# Patient Record
Sex: MALE | Race: WHITE | NOT HISPANIC OR LATINO | Employment: OTHER | URBAN - METROPOLITAN AREA
[De-identification: names, ages, dates, MRNs, and addresses within clinical notes are randomized per-mention and may not be internally consistent; named-entity substitution may affect disease eponyms.]

---

## 2020-03-02 ENCOUNTER — OFFICE VISIT (OUTPATIENT)
Dept: URGENT CARE | Facility: CLINIC | Age: 60
End: 2020-03-02
Payer: OTHER GOVERNMENT

## 2020-03-02 ENCOUNTER — HOSPITAL ENCOUNTER (OUTPATIENT)
Facility: MEDICAL CENTER | Age: 60
End: 2020-03-02
Attending: PHYSICIAN ASSISTANT
Payer: OTHER GOVERNMENT

## 2020-03-02 VITALS
TEMPERATURE: 98.6 F | HEIGHT: 70 IN | WEIGHT: 257 LBS | RESPIRATION RATE: 16 BRPM | DIASTOLIC BLOOD PRESSURE: 86 MMHG | SYSTOLIC BLOOD PRESSURE: 136 MMHG | HEART RATE: 86 BPM | OXYGEN SATURATION: 91 % | BODY MASS INDEX: 36.79 KG/M2

## 2020-03-02 DIAGNOSIS — L02.91 ABSCESS: ICD-10-CM

## 2020-03-02 DIAGNOSIS — J06.9 VIRAL UPPER RESPIRATORY TRACT INFECTION: ICD-10-CM

## 2020-03-02 PROCEDURE — 10060 I&D ABSCESS SIMPLE/SINGLE: CPT | Performed by: PHYSICIAN ASSISTANT

## 2020-03-02 PROCEDURE — 87070 CULTURE OTHR SPECIMN AEROBIC: CPT

## 2020-03-02 PROCEDURE — 87205 SMEAR GRAM STAIN: CPT

## 2020-03-02 PROCEDURE — 99203 OFFICE O/P NEW LOW 30 MIN: CPT | Mod: 25 | Performed by: PHYSICIAN ASSISTANT

## 2020-03-02 PROCEDURE — 99000 SPECIMEN HANDLING OFFICE-LAB: CPT | Performed by: PHYSICIAN ASSISTANT

## 2020-03-02 RX ORDER — DOXYCYCLINE HYCLATE 100 MG
100 TABLET ORAL 2 TIMES DAILY
Qty: 10 TAB | Refills: 0 | Status: SHIPPED | OUTPATIENT
Start: 2020-03-02 | End: 2020-03-07

## 2020-03-02 ASSESSMENT — ENCOUNTER SYMPTOMS
SORE THROAT: 0
HEMOPTYSIS: 0
RHINORRHEA: 1
DIARRHEA: 1
COUGH: 1
ROS SKIN COMMENTS: ABSCESS ON BACK
ABDOMINAL PAIN: 0
FEVER: 0
WHEEZING: 1
VOMITING: 0
BLOOD IN STOOL: 0
EYES NEGATIVE: 1
NAUSEA: 0
SHORTNESS OF BREATH: 1
MYALGIAS: 0
HEADACHES: 0
CHILLS: 0

## 2020-03-02 NOTE — PROGRESS NOTES
"Subjective:      Jerad Roldan is a 60 y.o. male who presents with Cough (patient states bad four day illness that caused chest congestion and chills and sweats, made him light headed and had to use Trendelenberg position to stay conscious, diarrhea. Now feels as though he may have a lung infection.)           Cough   This is a new problem. Episode onset: 4 days. The problem has been gradually worsening. The problem occurs constantly. The cough is productive of sputum. Associated symptoms include rhinorrhea, shortness of breath and wheezing. Pertinent negatives include no chest pain, chills, ear pain, fever, headaches, hemoptysis, myalgias, nasal congestion or sore throat.   Onset of symptoms 3 days ago,   Cold sweats, fevers, body aches, \"went into shock and had to lie down for a little Possibly due to the diarrhea\". Diarrhea is going away. Have not gone in a while. Fever, chills, and body aches have resolved.     Nyquil for 3 days. Started to feel better yesterday. Feels like I have infection in right lung.   Albuterol inhaler provides some relief.   No ibuprofen or tylenol today.     Abscess  Location. Onset 1 year ago. Waxing and waning. Location: left mid back.   Several months have been periodically squeezing it with some relief.   Passed couple weeks, redness tracking spread to left side and feels hard to touch.      Denies any fever or chills.        Review of Systems   Constitutional: Positive for malaise/fatigue. Negative for chills and fever.   HENT: Positive for congestion and rhinorrhea. Negative for ear pain and sore throat.    Eyes: Negative.    Respiratory: Positive for cough, shortness of breath and wheezing. Negative for hemoptysis.    Cardiovascular: Negative for chest pain.   Gastrointestinal: Positive for diarrhea. Negative for abdominal pain, blood in stool, nausea and vomiting.   Genitourinary: Negative.    Musculoskeletal: Negative for myalgias.   Skin:        Abscess on back  " "  Neurological: Negative for headaches.          Objective:     /86 (BP Location: Right arm, Patient Position: Sitting, BP Cuff Size: Large adult)   Pulse 86   Temp 37 °C (98.6 °F) (Temporal)   Resp 16   Ht 1.778 m (5' 10\")   Wt 116.6 kg (257 lb)   SpO2 91%   BMI 36.88 kg/m²      Physical Exam  Vitals signs reviewed.   Constitutional:       Appearance: Normal appearance.   HENT:      Right Ear: Tympanic membrane normal.      Left Ear: Tympanic membrane normal.      Nose: Nose normal.      Mouth/Throat:      Mouth: Mucous membranes are moist.      Pharynx: No oropharyngeal exudate or posterior oropharyngeal erythema.   Eyes:      Conjunctiva/sclera: Conjunctivae normal.   Cardiovascular:      Rate and Rhythm: Normal rate and regular rhythm.      Heart sounds: Normal heart sounds.   Pulmonary:      Effort: Pulmonary effort is normal. No respiratory distress.      Breath sounds: Normal breath sounds. No wheezing, rhonchi or rales.   Lymphadenopathy:      Cervical: No cervical adenopathy.   Skin:     General: Skin is warm and dry.             Comments: Small 2 cm round fluctuant nodule with no drainage.   Mild surrounding erythema and extension of erythema and induration about 8cm laterally. Mild edema. No other abscess identified. No crepitus.      Neurological:      General: No focal deficit present.      Mental Status: He is alert and oriented to person, place, and time.   Psychiatric:         Mood and Affect: Mood normal.         Behavior: Behavior normal.             History reviewed. No pertinent past medical history. History reviewed. No pertinent surgical history.   Social History     Socioeconomic History   • Marital status:      Spouse name: Not on file   • Number of children: Not on file   • Years of education: Not on file   • Highest education level: Not on file   Occupational History   • Not on file   Social Needs   • Financial resource strain: Not on file   • Food insecurity     Worry: " Not on file     Inability: Not on file   • Transportation needs     Medical: Not on file     Non-medical: Not on file   Tobacco Use   • Smoking status: Not on file   Substance and Sexual Activity   • Alcohol use: Not on file   • Drug use: Not on file   • Sexual activity: Not on file   Lifestyle   • Physical activity     Days per week: Not on file     Minutes per session: Not on file   • Stress: Not on file   Relationships   • Social connections     Talks on phone: Not on file     Gets together: Not on file     Attends Synagogue service: Not on file     Active member of club or organization: Not on file     Attends meetings of clubs or organizations: Not on file     Relationship status: Not on file   • Intimate partner violence     Fear of current or ex partner: Not on file     Emotionally abused: Not on file     Physically abused: Not on file     Forced sexual activity: Not on file   Other Topics Concern   • Not on file   Social History Narrative   • Not on file    Iodine and Shellfish allergy    Incision and Drainage Procedure    Indication: Abscess    Location: left thoracic back     Procedure: The patient was positioned appropriately and the skin over the incision site was prepped with betadine and draped in a sterile fashion. Local anesthesia was obtained by infiltration using 1% Lidocaine with epinephrine.  An incision was then made over the greatest area of fluctuance and approximately 4 cc of thick purulent and bloody material was expressed. Loculations were not present. The drainage cavity was then irrigated. Packed with 1/4 inch. Dressed with  Nonstick gauze.     The patient tolerated the procedure well.    Complications: None        Assessment/Plan:     1. Abscess    - doxycycline (VIBRAMYCIN) 100 MG Tab; Take 1 Tab by mouth 2 times a day for 5 days.  Dispense: 10 Tab; Refill: 0  - CULTURE WOUND W/ GRAM STAIN; Future    Abscess was incision and drainage.  See procedure note above.  Will treat with  doxycycline for 5 days to cover for bacterial infection.  Culture wound.  Instructed he may pull the packing in 2 days.   Advised to return in 3 days for reevaluation and any appropriate medication changes.     Patient states he is allergic to iodine.  He states, about 25 years ago, he says he drank a material that may have had iodine in it for requirement of visualization of his veins and stomach area.  The next day, he developed a rash to his buttocks area.  Consulted with Dr. Lomax here in the clinic about patient's possible iodine allergy.  He states there is iodine in food and salt and most likely he had reaction from some other substance.  He states it is okay to keep packing in but careful watch the area for any reaction.  I agreed to plan. I discussed with the patient that if any rash, welts, worsening swelling, or any concerns of allergic reaction, to pull packing out of abscess and return to the urgent care, or if any difficulty breathing, wheezing, severe reaction throughout his whole body, present to the emergency room.    Return earlier if any fevers, chills, worsening surrounding redness. The patient verbalized understanding and is in agreement.    2. Viral upper respiratory tract infection    The patient presents today with signs and symptoms consistent with a upper respiratory infection most likely viral etiology. They have a normal pulse oximetry on room air, afebrile, and a normal pulmonary exam. Therefore, I feel that the likelihood of pneumonia is low. Overall, the patient is very well appearing.     Recommended Tylenol/Ibuprofen for pain/fever, OTC cough and decongestant medication, Flonase, nasal saline washes.   Advised to return to the clinic or present to the ED if any worsening symptoms such as fever/chills, difficulty breathing, abdominal pain, or vomiting. Patient understood and agreed to plan of care.     Supportive care, differential diagnoses, and indications for immediate  follow-up discussed with patient.    Pathogenesis of diagnosis discussed including typical length and natural progression. Patient expresses understanding and agrees to plan.    Please note that this dictation was created using voice recognition software. I have made every reasonable attempt to correct obvious errors, but I expect that there are errors of grammar and possibly content that I did not discover before finalizing the note.

## 2020-03-03 LAB
GRAM STN SPEC: NORMAL
SIGNIFICANT IND 70042: NORMAL
SITE SITE: NORMAL
SOURCE SOURCE: NORMAL

## 2020-03-05 ENCOUNTER — OFFICE VISIT (OUTPATIENT)
Dept: URGENT CARE | Facility: CLINIC | Age: 60
End: 2020-03-05
Payer: OTHER GOVERNMENT

## 2020-03-05 VITALS
RESPIRATION RATE: 20 BRPM | WEIGHT: 260 LBS | DIASTOLIC BLOOD PRESSURE: 86 MMHG | TEMPERATURE: 98.7 F | HEART RATE: 89 BPM | BODY MASS INDEX: 37.22 KG/M2 | OXYGEN SATURATION: 95 % | HEIGHT: 70 IN | SYSTOLIC BLOOD PRESSURE: 120 MMHG

## 2020-03-05 DIAGNOSIS — L03.90 WOUND CELLULITIS: ICD-10-CM

## 2020-03-05 LAB
BACTERIA WND AEROBE CULT: ABNORMAL
BACTERIA WND AEROBE CULT: ABNORMAL
GRAM STN SPEC: ABNORMAL
SIGNIFICANT IND 70042: ABNORMAL
SITE SITE: ABNORMAL
SOURCE SOURCE: ABNORMAL

## 2020-03-05 PROCEDURE — 99024 POSTOP FOLLOW-UP VISIT: CPT | Performed by: PHYSICIAN ASSISTANT

## 2020-03-05 RX ORDER — CEPHALEXIN 500 MG/1
500 CAPSULE ORAL 4 TIMES DAILY
Qty: 20 CAP | Refills: 0 | Status: SHIPPED | OUTPATIENT
Start: 2020-03-05 | End: 2020-03-10

## 2020-03-05 ASSESSMENT — ENCOUNTER SYMPTOMS
FEVER: 0
CHILLS: 0

## 2020-03-05 ASSESSMENT — PAIN SCALES - GENERAL: PAINLEVEL: NO PAIN

## 2020-03-05 NOTE — PROGRESS NOTES
"Subjective:      Jerad Roldan is a 60 y.o. male who presents with Follow-Up (follow up on wound on back 3 day check)          HPI  Patient is a 60-year-old male who presents to the clinic for follow-up on wound check.  He was diagnosed with abscess and had incision and drainage 3 days ago and it was packed.  He was placed on doxycycline for possible bacterial infection.  He pulled the packing 2 days later and reports 2 tablespoons of purulent and bloody material.  Denies any worsening redness or drainage.    Currently, he denies any pain to the area or pain to palpation to his wound.  He denies any fevers, chills.  He has no other further complaints or concerns.       Review of Systems   Constitutional: Negative for chills and fever.   Skin: Negative for itching.        Drained abscess recheck.           Objective:     /86   Pulse 89   Temp 37.1 °C (98.7 °F) (Temporal)   Resp 20   Ht 1.778 m (5' 10\")   Wt 117.9 kg (260 lb)   SpO2 95%   BMI 37.31 kg/m²      Physical Exam  Vitals signs reviewed.   Constitutional:       General: He is not in acute distress.     Appearance: Normal appearance. He is not ill-appearing.   Eyes:      Conjunctiva/sclera: Conjunctivae normal.   Skin:     General: Skin is warm and dry.             Comments: Well-healing Small 2 cm wound with no drainage.  Improved surrounding erythema.  Continued extension of pink coloration and induration about 8cm laterally. No edema. No other abscess identified. No crepitus. No pain to palpation   Neurological:      General: No focal deficit present.      Mental Status: He is alert and oriented to person, place, and time.   Psychiatric:         Mood and Affect: Mood normal.         Behavior: Behavior normal.       No past medical history on file. No past surgical history on file.   Social History     Socioeconomic History   • Marital status:      Spouse name: Not on file   • Number of children: Not on file   • Years of education: " Not on file   • Highest education level: Not on file   Occupational History   • Not on file   Social Needs   • Financial resource strain: Not on file   • Food insecurity     Worry: Not on file     Inability: Not on file   • Transportation needs     Medical: Not on file     Non-medical: Not on file   Tobacco Use   • Smoking status: Not on file   Substance and Sexual Activity   • Alcohol use: Not on file   • Drug use: Not on file   • Sexual activity: Not on file   Lifestyle   • Physical activity     Days per week: Not on file     Minutes per session: Not on file   • Stress: Not on file   Relationships   • Social connections     Talks on phone: Not on file     Gets together: Not on file     Attends Anabaptist service: Not on file     Active member of club or organization: Not on file     Attends meetings of clubs or organizations: Not on file     Relationship status: Not on file   • Intimate partner violence     Fear of current or ex partner: Not on file     Emotionally abused: Not on file     Physically abused: Not on file     Forced sexual activity: Not on file   Other Topics Concern   • Not on file   Social History Narrative   • Not on file    Iodine and Shellfish allergy            Assessment/Plan:   1. Wound cellulitis    - cephALEXin (KEFLEX) 500 MG Cap; Take 1 Cap by mouth 4 times a day for 5 days.  Dispense: 20 Cap; Refill: 0    The wound appears to be improved compared to 3 days ago after incision and drainage.  Erythema has improved.  Wound culture result positive for Peptostreptococcus.  Due to continued tracking and duration from the wound, we will add cephalexin to treatment regimen.     Minimal pinkish/red drainage was expressed from the wound.  Wound was redressed.     Patient is overall well-appearing, afebrile, no complaints.  He was instructed to return to the urgent care for reevaluation in 5 days.     Return earlier if any fevers, chills, worsening redness, purulent discharge, or any other  concerns    Supportive care, differential diagnoses, and indications for immediate follow-up discussed with patient.    Pathogenesis of diagnosis discussed including typical length and natural progression. Patient expresses understanding and agrees to plan.    Please note that this dictation was created using voice recognition software. I have made every reasonable attempt to correct obvious errors, but I expect that there are errors of grammar and possibly content that I did not discover before finalizing the note.

## 2020-03-10 ENCOUNTER — OFFICE VISIT (OUTPATIENT)
Dept: URGENT CARE | Facility: CLINIC | Age: 60
End: 2020-03-10
Payer: OTHER GOVERNMENT

## 2020-03-10 VITALS
DIASTOLIC BLOOD PRESSURE: 84 MMHG | HEART RATE: 86 BPM | HEIGHT: 70 IN | WEIGHT: 263 LBS | TEMPERATURE: 98.9 F | OXYGEN SATURATION: 93 % | BODY MASS INDEX: 37.65 KG/M2 | RESPIRATION RATE: 16 BRPM | SYSTOLIC BLOOD PRESSURE: 130 MMHG

## 2020-03-10 DIAGNOSIS — Z51.89 WOUND CHECK, ABSCESS: ICD-10-CM

## 2020-03-10 PROCEDURE — 99024 POSTOP FOLLOW-UP VISIT: CPT | Performed by: PHYSICIAN ASSISTANT

## 2020-03-10 ASSESSMENT — ENCOUNTER SYMPTOMS
ROS SKIN COMMENTS: + ABSCESS
NAUSEA: 0
DIARRHEA: 0
FEVER: 0
MUSCULOSKELETAL NEGATIVE: 1
SHORTNESS OF BREATH: 0
CHILLS: 0
COUGH: 0
DIZZINESS: 0
VOMITING: 0
ABDOMINAL PAIN: 0

## 2020-03-10 NOTE — PROGRESS NOTES
"Subjective:      Jerad Roldan is a 60 y.o. male who presents with Wound Check            Wound Check   He was originally treated 5 to 10 days ago (8 days). Previous treatment included I&D of abscess and oral antibiotics. The maximum temperature noted was less than 100.4 F. The temperature was taken using an axillary reading. There has been no drainage from the wound. There is no redness present. There is no swelling present. There is no pain present. He has no difficulty moving the affected extremity or digit.     Patient was seen in urgent care initially 8 days ago for an abscess present on the left side of his back. I&D was performed and he was placed on a course of doxycycline. A course of keflex was then added at his follow up visit 5 days ago, which he just finished today. He reports significant improvement in the area. No pain, swelling, or discharge from the site. No fevers, chills, body aches, nausea, or vomiting.     Review of Systems   Constitutional: Negative for chills and fever.   HENT: Negative for congestion.    Respiratory: Negative for cough and shortness of breath.    Cardiovascular: Negative for chest pain.   Gastrointestinal: Negative for abdominal pain, diarrhea, nausea and vomiting.   Genitourinary: Negative.    Musculoskeletal: Negative.    Skin: Negative for rash.        + abscess   Neurological: Negative for dizziness.        Objective:     /84 (BP Location: Left arm, Patient Position: Sitting, BP Cuff Size: Adult)   Pulse 86   Temp 37.2 °C (98.9 °F) (Temporal)   Resp 16   Ht 1.778 m (5' 10\")   Wt 119.3 kg (263 lb)   SpO2 93%   BMI 37.74 kg/m²      Physical Exam  Vitals signs and nursing note reviewed.   Constitutional:       Appearance: Normal appearance. He is well-developed.   HENT:      Head: Normocephalic and atraumatic.      Nose: Nose normal.      Mouth/Throat:      Mouth: Mucous membranes are moist.   Eyes:      Pupils: Pupils are equal, round, and reactive to " light.   Neck:      Musculoskeletal: Normal range of motion.   Cardiovascular:      Rate and Rhythm: Normal rate and regular rhythm.   Pulmonary:      Effort: Pulmonary effort is normal.   Musculoskeletal: Normal range of motion.   Skin:     General: Skin is warm and dry.             Comments: Well healing wound present on left mid back with hyperpigmentation and very minimal induration noted. No erythema, edema, warmth to touch, or TTP. No fluctuance or discharge present.    Neurological:      Mental Status: He is alert and oriented to person, place, and time.   Psychiatric:         Behavior: Behavior normal.          PMH:  has no past medical history on file.  MEDS:   Current Outpatient Medications:   •  METFORMIN HCL PO, Take 500 mg by mouth 2 times a day, with meals., Disp: , Rfl:   •  GLIPIZIDE PO, Take 10 mg by mouth 2 times a day., Disp: , Rfl:   •  Nebivolol HCl (BYSTOLIC PO), Take  by mouth., Disp: , Rfl:   •  cephALEXin (KEFLEX) 500 MG Cap, Take 1 Cap by mouth 4 times a day for 5 days. (Patient not taking: Reported on 3/10/2020), Disp: 20 Cap, Rfl: 0  ALLERGIES:   Allergies   Allergen Reactions   • Iodine Hives   • Shellfish Allergy Hives     SURGHX: History reviewed. No pertinent surgical history.  SOCHX:    FH: family history is not on file.     Assessment/Plan:       1. Wound check, abscess    No evidence of residual infection at today's visit. No need for additional antibiotics. Monitor closely and RTC if signs of infection return. The patient demonstrated a good understanding and agreed with the treatment plan.

## 2021-07-13 ENCOUNTER — NON-PROVIDER VISIT (OUTPATIENT)
Dept: SLEEP MEDICINE | Facility: MEDICAL CENTER | Age: 61
End: 2021-07-13
Payer: COMMERCIAL

## 2021-07-13 VITALS — BODY MASS INDEX: 35.36 KG/M2 | HEIGHT: 70 IN | WEIGHT: 247 LBS

## 2021-07-13 DIAGNOSIS — R06.02 SHORTNESS OF BREATH: ICD-10-CM

## 2021-07-13 ASSESSMENT — PULMONARY FUNCTION TESTS
FEV1_PERCENT_PREDICTED: 93
FEV1: 3.24
FEV1/FVC_PERCENT_PREDICTED: 77
FEV1/FVC_PERCENT_PREDICTED: 103
FEV1/FVC: 79
FVC_PERCENT_PREDICTED: 90
FEV1_LLN: 2.90
FEV1/FVC_PREDICTED: 77
FEV1/FVC_PERCENT_PREDICTED: 102
FVC_PREDICTED: 4.52
FVC: 4.09
FEV1_PREDICTED: 3.48
FEV1/FVC: 79
FVC_LLN: 3.78
FEV1/FVC_PERCENT_LLN: 64
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
## 2021-07-14 PROCEDURE — 94729 DIFFUSING CAPACITY: CPT | Performed by: INTERNAL MEDICINE

## 2021-07-14 PROCEDURE — 94726 PLETHYSMOGRAPHY LUNG VOLUMES: CPT | Performed by: INTERNAL MEDICINE

## 2021-07-14 PROCEDURE — 94010 BREATHING CAPACITY TEST: CPT | Performed by: INTERNAL MEDICINE

## 2024-09-30 ENCOUNTER — APPOINTMENT (OUTPATIENT)
Dept: RADIOLOGY | Facility: IMAGING CENTER | Age: 64
End: 2024-09-30
Attending: NURSE PRACTITIONER
Payer: OTHER GOVERNMENT

## 2024-09-30 ENCOUNTER — OFFICE VISIT (OUTPATIENT)
Dept: URGENT CARE | Facility: CLINIC | Age: 64
End: 2024-09-30
Payer: OTHER GOVERNMENT

## 2024-09-30 VITALS
WEIGHT: 247.2 LBS | TEMPERATURE: 98.9 F | BODY MASS INDEX: 36.61 KG/M2 | SYSTOLIC BLOOD PRESSURE: 128 MMHG | HEIGHT: 69 IN | DIASTOLIC BLOOD PRESSURE: 84 MMHG | OXYGEN SATURATION: 96 % | HEART RATE: 89 BPM | RESPIRATION RATE: 19 BRPM

## 2024-09-30 DIAGNOSIS — Z23 NEED FOR TETANUS BOOSTER: ICD-10-CM

## 2024-09-30 DIAGNOSIS — S92.424A CLOSED NONDISPLACED FRACTURE OF DISTAL PHALANX OF RIGHT GREAT TOE, INITIAL ENCOUNTER: Primary | ICD-10-CM

## 2024-09-30 DIAGNOSIS — S99.921A INJURY OF TOE ON RIGHT FOOT, INITIAL ENCOUNTER: ICD-10-CM

## 2024-09-30 DIAGNOSIS — L08.9 TOE INFECTION: ICD-10-CM

## 2024-09-30 DIAGNOSIS — S91.139A PUNCTURE WOUND OF TOE, INITIAL ENCOUNTER: ICD-10-CM

## 2024-09-30 PROCEDURE — 73660 X-RAY EXAM OF TOE(S): CPT | Mod: TC,FY,RT | Performed by: NURSE PRACTITIONER

## 2024-09-30 RX ORDER — CEPHALEXIN 500 MG/1
500 CAPSULE ORAL 4 TIMES DAILY
Qty: 20 CAPSULE | Refills: 0 | Status: SHIPPED | OUTPATIENT
Start: 2024-09-30 | End: 2024-10-05

## 2024-09-30 RX ORDER — VALSARTAN AND HYDROCHLOROTHIAZIDE 320; 25 MG/1; MG/1
TABLET, FILM COATED ORAL
COMMUNITY
Start: 2024-06-06

## 2024-09-30 RX ORDER — ALBUTEROL SULFATE 90 UG/1
INHALANT RESPIRATORY (INHALATION)
COMMUNITY
Start: 2024-07-03

## 2024-09-30 NOTE — PROGRESS NOTES
"Jerad Roldan is a 64 y.o. male who presents for Toe Pain (Right Toe Swelling 4 days )      HPI  This is a new problem. Jerad Roldan is a 64 y.o. patient who presents to urgent care with c/o: Stubbed his toe on a piece of wood 4 days ago.  There is increased swelling and redness.  There was a puncture into the tip of his toe.  He washed it and scrubbed it and then soaked it with Epsom salt.  He does not believe there is a foreign body but because of the increased swelling and tenderness now he is uncertain.  He is here because he needs a tetanus booster.  Is has been many years since he has had a tetanus shot.  No other aggravating relieving factors.  Denies fever, chills, bony pain    ROS See HPI    Allergies:       Allergies   Allergen Reactions    Iodine Hives    Shellfish Allergy Hives       PMSFS Hx:  No past medical history on file.  No past surgical history on file.  No family history on file.  Social History     Tobacco Use    Smoking status: Never    Smokeless tobacco: Never   Substance Use Topics    Alcohol use: Not Currently       Problems:   There is no problem list on file for this patient.      Medications:   Current Outpatient Medications on File Prior to Visit   Medication Sig Dispense Refill    albuterol 108 (90 Base) MCG/ACT Aero Soln inhalation aerosol Inhale.      valsartan-hydrochlorothiazide (DIOVAN-HCT) 320-25 MG per tablet       METFORMIN HCL PO Take 500 mg by mouth 2 times a day, with meals.      GLIPIZIDE PO Take 10 mg by mouth 2 times a day.      Nebivolol HCl (BYSTOLIC PO) Take  by mouth.       No current facility-administered medications on file prior to visit.        Objective:     /84   Pulse 89   Temp 37.2 °C (98.9 °F) (Temporal)   Resp 19   Ht 1.753 m (5' 9\")   Wt 112 kg (247 lb 3.2 oz)   SpO2 96%   BMI 36.51 kg/m²     Physical Exam  Vitals and nursing note reviewed.   Constitutional:       Appearance: He is well-developed.   HENT:      Head: Normocephalic and " atraumatic.   Cardiovascular:      Rate and Rhythm: Normal rate and regular rhythm.      Heart sounds: Normal heart sounds.   Pulmonary:      Effort: Pulmonary effort is normal.      Breath sounds: Normal breath sounds.   Musculoskeletal:      Cervical back: Normal range of motion.      Right foot: Swelling (Right great toe), tenderness and bony tenderness present.        Legs:    Skin:     General: Skin is warm and dry.   Neurological:      Mental Status: He is alert and oriented to person, place, and time.   Psychiatric:         Behavior: Behavior normal.         Thought Content: Thought content normal.         Judgment: Judgment normal.       RADIOLOGY RESULTS   DX-TOE(S) 2+ RIGHT    Result Date: 9/30/2024 9/30/2024 11:24 AM HISTORY/REASON FOR EXAM:  Pain/Deformity Following Trauma. TECHNIQUE/EXAM DESCRIPTION AND NUMBER OF VIEWS:  3 views of the  RIGHT RIGHT toe. COMPARISON: None FINDINGS: MINERALIZATION: Mineralization is unremarkable for age. INJURY: There is subtle lucency extending through the medial base of the distal phalanx of the great toe seen only on the oblique image. JOINTS: No erosive arthropathy is evident.     Findings moderately suspicious for fracture of the distal phalanx of the great toe         Xray: Reviewed and interpreted independently by me. I agree with the radiologist's findings.       Assessment /Associated Orders:      1. Closed nondisplaced fracture of distal phalanx of right great toe, initial encounter        2. Injury of toe on right foot, initial encounter  DX-TOE(S) 2+ RIGHT    Tdap =>6yo IM      3. Toe infection  cephALEXin (KEFLEX) 500 MG Cap      4. Need for tetanus booster  Tdap =>6yo IM      5. Puncture wound of toe, initial encounter            Medical Decision Making:    Jerad is a very pleasant 64 y.o. male who is clinically stable at today's acute urgent care visit.  No acute distress noted.  VSS. Appropriate for outpatient care at this time.   Acute problem today with  uncertain prognosis.   Tdap indicated and given today.   Epson salt and warm water soaks twice daily to 3 times daily to promote healing  OTC  analgesic of choice (acetaminophen or NSAID) prn pain. Follow manufactures dosing and safety precautions.     CAM boot - short - do not drive with CAM boot  Educated in proper administration of  prescription medication(s) ordered today including safety, possible SE, risks, benefits, rationale and alternatives to therapy.   FU with PCP - declines ortho / sport med referral at this time.   Discussed Dx, management options (risks,benefits, and alternatives to planned treatment), natural progression and supportive care.  Expressed understanding and the treatment plan was agreed upon.   Questions were encouraged and answered   Return to urgent care prn if new or worsening sx or if there is no improvement in condition prn.         Please note that this dictation was created using voice recognition software. I have worked with consultants from the vendor as well as technical experts from [facility] to optimize the interface. I have made every reasonable attempt to correct obvious errors, but I expect that there are errors of grammar and possibly content that I did not discover before finalizing the note.  This note was electronically signed by provider